# Patient Record
Sex: FEMALE | Race: WHITE | NOT HISPANIC OR LATINO | Employment: UNEMPLOYED | URBAN - METROPOLITAN AREA
[De-identification: names, ages, dates, MRNs, and addresses within clinical notes are randomized per-mention and may not be internally consistent; named-entity substitution may affect disease eponyms.]

---
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
## 2022-09-10 ENCOUNTER — HEALTH MAINTENANCE LETTER (OUTPATIENT)
Age: 22
End: 2022-09-10

## 2023-01-05 NOTE — TELEPHONE ENCOUNTER
Pediatric Panel Management Review      Patient has the following on her problem list:   Immunizations  Immunizations are needed.  Patient is due for:Well Child HPV and Menactra.        Summary:    Patient is due/failing the following:   Immunizations and Physical.    Action needed:   Patient needs office visit for well child check.    Type of outreach:    Sent letter    Questions for provider review:    None.                                                                                                                                    DANIEL Villalta       Chart routed to Care Team .             RICKY

## 2023-10-01 ENCOUNTER — HEALTH MAINTENANCE LETTER (OUTPATIENT)
Age: 23
End: 2023-10-01

## 2024-02-04 ENCOUNTER — TELEPHONE (OUTPATIENT)
Dept: EMERGENCY MEDICINE | Facility: CLINIC | Age: 24
End: 2024-02-04

## 2024-02-04 ENCOUNTER — HOSPITAL ENCOUNTER (EMERGENCY)
Facility: CLINIC | Age: 24
Discharge: HOME | End: 2024-02-04
Attending: EMERGENCY MEDICINE | Admitting: EMERGENCY MEDICINE
Payer: COMMERCIAL

## 2024-02-04 VITALS
SYSTOLIC BLOOD PRESSURE: 127 MMHG | RESPIRATION RATE: 16 BRPM | DIASTOLIC BLOOD PRESSURE: 97 MMHG | TEMPERATURE: 98.5 F | HEART RATE: 112 BPM | OXYGEN SATURATION: 97 %

## 2024-02-04 DIAGNOSIS — F10.920 ALCOHOLIC INTOXICATION WITHOUT COMPLICATION (H): ICD-10-CM

## 2024-02-04 DIAGNOSIS — R45.851 SUICIDE IDEATION: ICD-10-CM

## 2024-02-04 DIAGNOSIS — F41.9 ANXIETY: ICD-10-CM

## 2024-02-04 PROBLEM — F10.90 ALCOHOL USE DISORDER: Status: ACTIVE | Noted: 2024-02-04

## 2024-02-04 PROBLEM — F41.1 GENERALIZED ANXIETY DISORDER: Status: ACTIVE | Noted: 2024-02-04

## 2024-02-04 LAB
AMPHETAMINES UR QL SCN: NORMAL
BARBITURATES UR QL SCN: NORMAL
BENZODIAZ UR QL SCN: NORMAL
BZE UR QL SCN: NORMAL
CANNABINOIDS UR QL SCN: NORMAL
FENTANYL UR QL: NORMAL
HCG UR QL: NEGATIVE
OPIATES UR QL SCN: NORMAL
PCP QUAL URINE (ROCHE): NORMAL

## 2024-02-04 PROCEDURE — 99285 EMERGENCY DEPT VISIT HI MDM: CPT | Performed by: EMERGENCY MEDICINE

## 2024-02-04 PROCEDURE — 80307 DRUG TEST PRSMV CHEM ANLYZR: CPT | Performed by: EMERGENCY MEDICINE

## 2024-02-04 PROCEDURE — 81025 URINE PREGNANCY TEST: CPT | Performed by: EMERGENCY MEDICINE

## 2024-02-04 RX ORDER — GABAPENTIN 100 MG/1
100 CAPSULE ORAL 3 TIMES DAILY
COMMUNITY
Start: 2023-09-29

## 2024-02-04 RX ORDER — GABAPENTIN 300 MG/1
300 CAPSULE ORAL
COMMUNITY
Start: 2023-09-29

## 2024-02-04 RX ORDER — VENLAFAXINE HYDROCHLORIDE 150 MG/1
1 CAPSULE, EXTENDED RELEASE ORAL DAILY
COMMUNITY
Start: 2023-10-02

## 2024-02-04 ASSESSMENT — ACTIVITIES OF DAILY LIVING (ADL)
ADLS_ACUITY_SCORE: 35

## 2024-02-04 NOTE — ED TRIAGE NOTES
Pt presents with mental health crisis. Patient states that her anxiety has been worsening to the point that she feels like she can no longer stand it. Pt endorses thoughts of wanting to be dead due to this anxiety although denies active suicidal ideation. Patient has a history of cutting, most recently this summer. Patient states that she has been drinking too much, 5-8 shots a day and cannot remember last sober period. Denies withdrawal symptoms.      Triage Assessment (Adult)       Row Name 02/04/24 0115          Triage Assessment    Airway WDL WDL        Respiratory WDL    Respiratory WDL WDL        Skin Circulation/Temperature WDL    Skin Circulation/Temperature WDL WDL        Cardiac WDL    Cardiac WDL WDL        Cognitive/Neuro/Behavioral WDL    Cognitive/Neuro/Behavioral WDL WDL

## 2024-02-04 NOTE — CONSULTS
Diagnostic Evaluation Consultation  Crisis Assessment    Patient Name: Meghann Vincent  Age:  23 year old  Legal Sex: female  Gender Identity: female  Pronouns:   Race: White  Ethnicity: Not  or   Language: English      Patient was assessed: In person      Patient location: [facility] EMERGENCY DEPARTMENT                             ED11    Referral Data and Chief Complaint  Meghann Vincent presents to the ED with family/friends. Patient is presenting to the ED for the following concerns: Significant behavioral change, Anxiety, Depression, Suicidal ideation, Intoxication, Substance use, Worsening psychosocial stress.   Factors that make the mental health crisis life threatening or complex are:  Meghann Vincent is a 23 year old female with a past medical history significant for general anxiety disorder who presents to the ED for mental health evaluation.  Patient presented to this ED along with her significant other.  Patient reported that her anxiety has become debilitating to the point that she cannot maintain employment and often is afraid to leave her home.  Patient noted a history of anxiety and family members with significant mental health and substance abuse issues.  Patient cited being prescribed with Effecor 150 mg. but denied any benefits from it.  Patient endorsed thoughts of self self-harm but denied wanting to end her life, but at times disclosed that due to her anxiety that she wouled be better off gone, but wants to live.  No self-injury behavior tonight however has had history of self-injury behavior with cutting in the past.  Patient reported that she took 6 tablets of her gabapentin in July of 2023, but did not intent to die, but was overwhelmed and she just slept it off.    Patient also notes increase in alcohol intake, drinking too much alcohol, and states that this is likely also affecting her anxiety and mental health.  Patient reports drinking approximately 5-8  shots per day.  Patient does not currently have community mental health providers in place..      Informed Consent and Assessment Methods  Explained the crisis assessment process, including applicable information disclosures and limits to confidentiality, assessed understanding of the process, and obtained consent to proceed with the assessment.  Assessment methods included conducting a formal interview with patient, review of medical records, collaboration with medical staff, and obtaining relevant collateral information from family and community providers when available.  : done     Patient response to interventions: eager to participate, acceptance expressed, verbalizes understanding  Coping skills were attempted to reduce the crisis:  Patient has an ability to practice mindfulness skills to alleve her crisis.     History of the Crisis   History of anxiety and depression - never been hospitalized for mental health concerns.    Brief Psychosocial History  Family:  Domestic Partnership, Children no  Support System:  Significant Other, Partner, Parent(s)  Employment Status:  unemployed  Source of Income:  salary/wages  Financial Environmental Concerns:  none  Current Hobbies:  family functions, games, interaction with pets, social media/computer activities, reading, music, television/movies/videos  Barriers in Personal Life:  lack of motivation, emotional concerns, mental health concerns    Significant Clinical History  Current Anxiety Symptoms:  obsessions/compulsions, panic attack, anxious  Current Depression/Trauma:  sense of doom, hopelessness, helplessness, impaired decision making  Current Somatic Symptoms:  excessive worry, sweating, flushing, shaking, anxious  Current Psychosis/Thought Disturbance:  impulsive, high risk behavior, distractability  Current Eating Symptoms:     Chemical Use History:  Alcohol: Daily  Last Use:: 02/03/24  Benzodiazepines: None  Opiates: None  Cocaine: None  Marijuana: None  Other  Use: None  Withdrawal Symptoms: Nausea  Addictions: Other (comment) (alcohol)   Past diagnosis:  Anxiety Disorder, No known past diagnosis  Family history:  Anxiety Disorder, Bipolar Disorder, Depression, Substance Use Disorder  Past treatment:  Individual therapy  Details of most recent treatment:  Recently had a therapist with [facility] - quit after a few sessions and did not find it effective.  Other relevant history:          Collateral Information  Is there collateral information: Yes     Collateral information name, relationship, phone number:  Jack - [phone] - Boyfrienabel said that he was concerned that patient might try to hurt herself since she was making some threats about SI, but boyfriend said that he knows that she would never follow through.  Alcohol has become the contributing factor or issue.    What happened today: Drinking and making SI threats without plan/intent.     What is different about patient's functioning: Patient has increased alcohol intake.     Concern about alcohol/drug use:  yes - excessive use past 6 months.    What do you think the patient needs:  Treatment    Has patient made comments about wanting to kill themselves/others: yes    If d/c is recommended, can they take part in safety/aftercare planning:  yes    Additional collateral information:  Boyfriend said that she is safe returning home.     Risk Assessment  [place] Suicide Severity Rating Scale Full Clinical Version:  Suicidal Ideation  Q1 Wish to be Dead (Lifetime): Yes  Q2 Non-Specific Active Suicidal Thoughts (Lifetime): Yes  3. Active Suicidal Ideation with any Methods (Not Plan) Without Intent to Act (Lifetime): No  Q4 Active Suicidal Ideation with Some Intent to Act, Without Specific Plan (Lifetime): No  Q5 Active Suicidal Ideation with Specific Plan and Intent (Lifetime): No  Q6 Suicide Behavior (Lifetime): yes     Suicidal Behavior (Lifetime)  Actual Attempt (Lifetime): No  Has subject engaged in  non-suicidal self-injurious behavior? (Lifetime): Yes  Interrupted Attempts (Lifetime): No  Aborted or Self-Interrupted Attempt (Lifetime): No  Preparatory Acts or Behavior (Lifetime): No    [place] Suicide Severity Rating Scale Recent:   Suicidal Ideation (Recent)  Q1 Wished to be Dead (Past Month): yes  Q2 Suicidal Thoughts (Past Month): yes  Q3 Suicidal Thought Method: yes  Q4 Suicidal Intent without Specific Plan: yes  Q5 Suicide Intent with Specific Plan: no  Within the Past 3 Months?: no  Level of Risk per Screen: high risk          Environmental or Psychosocial Events: loss of a relationship due to divorce/separation, work or task failure, impulsivity/recklessness, other life stressors, ongoing abuse of substances, neither working nor attending school  Protective Factors: Protective Factors: responsibilities and duties to others, including pets and children, help seeking, intact marriage or domestic partnership, sense of importance of health and wellness, sense of belonging, lives in a responsibly safe and stable environment, strong bond to family unit, community support, or employment    Does the patient have thoughts of harming others? Feels Like Hurting Others: no  Previous Attempt to Hurt Others: no  Is the patient engaging in sexually inappropriate behavior?: no    Is the patient engaging in sexually inappropriate behavior?  no        Mental Status Exam   Affect: Appropriate  Appearance: Inappropriate (wearing shorts in cold weather)  Attention Span/Concentration: Attentive  Eye Contact: Engaged    Fund of Knowledge: Appropriate   Language /Speech Content: Fluent  Language /Speech Volume: Normal  Language /Speech Rate/Productions: Normal  Recent Memory: Intact  Remote Memory: Intact  Mood: Anxious, Sad, Depressed  Orientation to Person: Yes   Orientation to Place: Yes  Orientation to Time of Day: Yes  Orientation to Date: Yes     Situation (Do they understand why they are here?): Yes  Psychomotor  Behavior: Normal  Thought Content: Clear  Thought Form: Obsessive/Perseverative          Medication  Psychotropic medications:   Medication Orders - Psychiatric (From admission, onward)      Start     Dose/Rate Route Frequency Ordered Stop    02/04/24 0225  nicotine (NICORETTE) gum 2 mg        Note to Pharmacy: DO NOT USE THIS FIELD FOR ADMIN INSTRUCTIONS; INFORMATION DOES NOT SHOW ON MAR. USE THE FIELD ABOVE MARKED ADMIN INSTRUCTIONS    2 mg Buccal ONCE 02/04/24 0223               Current Care Team  Patient Care Team:  Becky Sorto MD as PCP - General (Pediatrics)    Diagnosis  Patient Active Problem List   Diagnosis Code    Vitamin D deficiency E55.9    Nevus D22.9    Childhood obesity, BMI  percentile E66.9, Z68.54    Problems related to inappropriate diet and eating habits Z72.4    Generalized anxiety disorder F41.1    Alcohol use disorder F10.90       Primary Problem This Admission  Active Hospital Problems    Generalized anxiety disorder      Alcohol use disorder        Clinical Summary and Substantiation of Recommendations   Meghann Vincent is a 23 year old female with a past medical history significant for general anxiety disorder who presents to the ED for mental health evaluation. Patient presented to this ED along with her significant other. Patient reported that her anxiety has become debilitating to the point that she cannot maintain employment and often is afraid to leave her home. Patient noted a history of anxiety and family members with significant mental health and substance abuse issues. Patient cited being prescribed with Effecor 150 mg. but denied any benefits from it. Patient endorsed thoughts of self self-harm but denied wanting to end her life, but at times disclosed that due to her anxiety that she wouled be better off gone, but wants to live. No self-injury behavior tonight however has had history of self-injury behavior with cutting in the past. Patient reported that she  took 6 tablets of her gabapentin in July of 2023, but did not intent to die, but was overwhelmed and she just slept it off. Patient also notes increase in alcohol intake, drinking too much alcohol, and states that this is likely also affecting her anxiety and mental health. Patient reports drinking approximately 5-8 shots per day. Patient does not currently have community mental health providers in place.  Patient agreed to participate in a dual diagnosis program through [facility] to develop coping skills to deal with anxiety and undergo CD evaluation and cooperate with recommendations for alchol abuse.  Patient will also be referred to [facility] for crisis stabilization and continuity of mental health services until she is able to start with the dual diagnosis program.  This clinician assessed patient and recommend that she be discharged home since she does not pose a risk for harm to self or others and can engage in safety planning.       Patient coping skills attempted to reduce the crisis:  Patient has an ability to practice mindfulness skills to alleve her crisis.    Disposition  Recommended disposition: Individual Therapy, Medication Management, Programmatic Care, Rule 25/MARISOL Assessment, Substance Abuse Disorder Treatment        Reviewed case and recommendations with attending provider. Attending Name: Dr. Manzano       Attending concurs with disposition: yes       Patient and/or validated legal guardian concurs with disposition:   yes       Final disposition:  discharge    Legal status on admission:      Assessment Details   Total duration spent with the patient: 30 min     CPT code(s) utilized: 12084 - Psychotherapy for Crisis - 60 (30-74*) min    Kerrie Rivera [facility], Psychotherapist  [facility] - Triage & Transition Services  Callback: [phone]

## 2024-02-04 NOTE — DISCHARGE INSTRUCTIONS
You have been scheduled with the [facility] for a Diagnostic Assessment appointment on Friday, March 8th at 8am . Please allow up to 90 -120 minutes for your appointment. This is an IN-PERSON appointment.    [facility]  [address] [id number]      *Follow the signs to the Emergency Room and park in the Yellow or Red Ramp.  You can obtain a reduced parking fee of $2 after your appointment.  The office is located next to [facility].  The  office number is [phone].    Please arrive before you scheduled appointment time, late arrivals are subject to the need to reschedule.   Please bring contact names and phone numbers for Emergency Contacts, therapist, psychiatrist, PO, attorneys, or other relevant contacts that may be needed.    *Face masking is optional.    Please note: Parents must accompany any patient under the age of 18. Any patient under legal guardianship will need to bring court documents.    Adult appointments can last up to 90+ minutes.    You will receive a phone call 2-4 days prior to your scheduled time to confirm and remind you of the appointment.      You will receive intake forms via [facility] ([email or web address]) to be completed prior to your appointment.  If able, please complete this prior to your appointment to reduce the appt length of time.      If you need to change this appointment for any reason, please call our Behavioral Access Scheduling office at [phone].  Please note, we ask for at least a 24-hour notice.  Any late cancelations will be considered a no-show.         We are making a referral to the transition clinic for bridging telemedicine appointments (therapy) until your scheduled appointment.  They will be reaching out to you via e-mail or phone in the coming days.  They can be reached at [phone].         A referral to [facility]'s [facility] was also  completed for you. You can reach them at [phone] for updates on this referral or if you need to cancel it if you decide to attend the [facility] program instead. Their address is: [facility], [address] [place] [id number].      It is your responsibility to contact your insurance company directly to verify coverage, eligibility, payment, and benefit information for any appointments or referrals listed above.    [facility] maintains an extensive network of licensed behavioral health providers to connect patients with the services they need. We do not charge providers a fee to participate in our referral network. We match patients with providers based on a patient's specific treatment needs, insurance coverage, and location. Our first effort will be to refer you to a provider within your care system and will utilize providers outside your care system as needed.

## 2024-02-04 NOTE — ED NOTES
Pt. has slept well.  No behavioral concerns.  Pt. will have mental health assessment this a.m. to determine disposition. Continue PPOC.

## 2024-02-04 NOTE — ED PROVIDER NOTES
[facility] EMERGENCY DEPARTMENT ([facility])    2/04/24      ED PROVIDER NOTE      History     Chief Complaint   Patient presents with    Anxiety     Pt endorsing worsening anxiety, pt states she has been on effexor x1 year and feels it is making it worse    Suicidal     Pt states she has thoughts of wanting to be dead due to anxiety. Denies plan. Hx of cutting, last time this summer    Drug / Alcohol Assessment     Pt states she is drinking too much, pt states she is drinking daily 5-8 shots. Cannot remember last sober day.     HPI  Meghann Vincent is a 23 year old female with a past medical history significant for general anxiety disorder who presents to the ED for mental health evaluation.  Patient here with significant other.  Per triage note patient reports worsening anxiety, has been on Effexor for 1 year and feels as if it is making it worse.  Patient also reports suicide ideation and thoughts of wanting to be dead secondary to her anxiety.  Denies any specific plan.  Patient has thoughts of self self-harm.  No self-injury behavior tonight however has had history of self-injury behavior with cutting in the past.  Last episode was in the summer.  Patient also notes increase in alcohol intake, drinking too much alcohol, and states that this is likely also affecting her anxiety and mental health.  Patient reports drinking approximately 5-8 shots per day. No acute medical complaints.  Positive nicotine use.  No other substance use.    Past Medical History  No past medical history on file.  No past surgical history on file.  gabapentin (NEURONTIN) 100 MG capsule  gabapentin (NEURONTIN) 300 MG capsule  venlafaxine (EFFEXOR XR) 150 MG 24 hr capsule  cefdinir (OMNICEF) 300 MG capsule  levonorgestrel-ethinyl estradiol (AVIANE/ALESSE/LESSINA) 0.1-20 MG-MCG tablet      No Known Allergies  Family History  Family History   Problem Relation Age of Onset    Family History Negative Mother      Social History    Social History     Tobacco Use    Smoking status: Passive Smoke Exposure - Never Smoker    Smokeless tobacco: Never   Substance Use Topics    Alcohol use: No    Drug use: No      Past medical history, past surgical history, medications, allergies, family history, and social history were reviewed with the patient. No additional pertinent items.      A medically appropriate review of systems was performed with pertinent positives and negatives noted in the HPI, and all other systems negative.    Physical Exam   BP: (!) 127/97  Pulse: (!) 131  Temp: 98.5  F (36.9  C)  Resp: 16  SpO2: 98 %  Physical Exam  General: Afebrile, no acute distress   HEENT: Normocephalic, atraumatic, conjunctivae normal. MMM  Neck: non-tender, supple  Cardio: regular rate. regular rhythm   Resp: Normal work of breathing, no respiratory distress, lungs clear bilaterally, no wheezing, rhonchi, rales  Chest/Back: no visual signs of trauma, no CVA tenderness   Abdomen: soft, non distension, no tenderness, no peritoneal signs   Neuro: alert and fully oriented. CN II-XII grossly intact. Grossly normal strength and sensation in all extremities.   MSK: no deformities. Normal range of motion  Integumentary/Skin: no rash visualized, normal color  Psych: normal affect, normal behavior      ED Course, Procedures, & Data      Procedures         Mental Health Risk Assessment        PSS-3      Date and Time Over the past 2 weeks have you felt down, depressed, or hopeless? Over the past 2 weeks have you had thoughts of killing yourself? Have you ever attempted to kill yourself? When did this last happen? User   02/04/24 0120 yes yes no -- KMV          C-SSRS ([place])      Date and Time Q1 Wished to be Dead (Past Month) Q2 Suicidal Thoughts (Past Month) Q3 Suicidal Thought Method Q4 Suicidal Intent without Specific Plan Q5 Suicide Intent with Specific Plan Q6 Suicide Behavior (Lifetime) Within the Past 3 Months? RETIRED: Level of Risk per Screen Screening  Not Complete User   02/04/24 0143 yes yes yes yes no yes -- -- -- NG   02/04/24 0120 yes yes yes yes no -- -- -- -- KMV                Suicide assessment completed by mental health (D.E.C., LCSW, etc.)       Results for orders placed or performed during the hospital encounter of 02/04/24   HCG qualitative urine     Status: Normal   Result Value Ref Range    hCG Urine Qualitative Negative Negative   Urine Drug Screen Panel     Status: Normal   Result Value Ref Range    Amphetamines Urine Screen Negative Screen Negative    Barbituates Urine Screen Negative Screen Negative    Benzodiazepine Urine Screen Negative Screen Negative    Cannabinoids Urine Screen Negative Screen Negative    Cocaine Urine Screen Negative Screen Negative    Fentanyl Qual Urine Screen Negative Screen Negative    Opiates Urine Screen Negative Screen Negative    PCP Urine Screen Negative Screen Negative   Urine Drug Screen     Status: Normal    Narrative    The following orders were created for panel order Urine Drug Screen.  Procedure                               Abnormality         Status                     ---------                               -----------         ------                     Urine Drug Screen Panel[[id number]]      Normal              Final result                 Please view results for these tests on the individual orders.     Medications   nicotine (NICORETTE) gum 2 mg (has no administration in time range)     Labs Ordered and Resulted from Time of ED Arrival to Time of ED Departure   HCG QUALITATIVE URINE - Normal       Result Value    hCG Urine Qualitative Negative     URINE DRUG SCREEN PANEL - Normal    Amphetamines Urine Screen Negative      Barbituates Urine Screen Negative      Benzodiazepine Urine Screen Negative      Cannabinoids Urine Screen Negative      Cocaine Urine Screen Negative      Fentanyl Qual Urine Screen Negative      Opiates Urine Screen Negative      PCP Urine Screen Negative       No orders to display           Critical care was not performed.     Medical Decision Making  The patient's presentation was of high complexity (a chronic illness severe exacerbation, progression, or side effect of treatment).    The patient's evaluation involved:  an assessment requiring an independent historian (significant other)  review of external note(s) from 3+ sources (prior Family practice visit notes)  discussion of management or test interpretation with another health professional (behavioral health , morning provider)    The patient's management necessitated high risk (a decision regarding hospitalization) and further care after sign-out to morning provider (see their note for further management).    Assessment & Plan    Meghann Vincent is a 23 year old female with a past medical history significant for general anxiety disorder who presents to the ED for mental health evaluation.  Patient reports worsening anxiety, suicide ideation/thoughts, and thoughts of self-harm along with increasing alcohol use.  No specific plan, no acute medical complaints.  Patient presents tonight with her significant other (Jack Sutton - [phone]).     Patient signed out to morning provider pending behavioral health assessment, reevaluation, final disposition.          I have reviewed the nursing notes. I have reviewed the findings, diagnosis, plan and need for follow up with the patient.    New Prescriptions    No medications on file       Final diagnoses:   Anxiety   Suicide ideation   Alcoholic intoxication without complication (H24)       Millie Beach   [facility] EMERGENCY DEPARTMENT  2/4/2024     Millie Beach MD  02/04/24 0408

## 2024-02-04 NOTE — PROGRESS NOTES
Patient signed out from Dr. Beach pending evaluation by [facility], appreciate their recommendations    They evaluated the patient and feel that she is appropriate for outpatient therapy which was Dr. Beach's impression as well.  They have provided her with intensive resources as well as a plan for in person visits.  I discussed this plan with the patient and her , she is comfortable with this plan and would like to leave to pursue it.  Will proceed with discharge.

## 2024-02-04 NOTE — ED NOTES
Pt requested nicotine gum, order obtained, upon attempt to give the gum pt was found to be sleep. Dose re-timed to later in the AM.

## 2024-02-04 NOTE — TELEPHONE ENCOUNTER
"Pt is a(n) adult (18+ out of HS) Seeking as eval for Adult Dual Assessment for evaluation of both MH & MARISOL to determine primary treatment options..  Appointment scheduled by:  Patient.  (self-pay - complete Cost Estimate)  Caller name:  Erwin Peterson     Caller phone #: [phone]  Legal Guardianship Reviewed?  Yes: self  Honoring Choices Notified?  No  Brief reason for appt:  Per LMHP, patient would benefit from MH and MARISOL programmatic care. See [facility] Assessment for more info.      needed?  NO    Contact information verified/updated: Yes    Erwin Gibbs    \"We have scheduled your evaluation. In the event that your insurance coverage comes back as out of network, you may receive a call to cancel your appointment and direct you to your insurance company for in-network coverage.\"    Disclaimer regarding insurance read to patient?  No   "

## 2024-02-05 ENCOUNTER — TELEPHONE (OUTPATIENT)
Dept: BEHAVIORAL HEALTH | Facility: CLINIC | Age: 24
End: 2024-02-05
Payer: COMMERCIAL

## 2024-02-05 NOTE — TELEPHONE ENCOUNTER
First attempt to contact pt. Terryr left a VM with TC contact info and encouraged a phone call back to schedule initial therapy appointment. Terryr will postpone for tomorrow.    Serena Lundy  02/05/2024  1014

## 2024-02-05 NOTE — TELEPHONE ENCOUNTER
Left message for patient asking for a return call to schedule with the TC.     Velia Guerrero  Transition Clinic Coordinator  02/05/24 11:44 AM

## 2024-02-05 NOTE — TELEPHONE ENCOUNTER
----- Message from Erwin Gibbs sent at 2/4/2024  9:19 AM CST -----  Regarding: Referral for Therapy  Transition Clinic Referral   [place]       Please Check Type of Referral Requested:       __x__THERAPY: The Transition clinic is able to schedule patients without current medical insurance; these patient will be referred to our Social Work Care Coordinator for Medical Insurance              Assistance. We are open for referral for psychotherapy. Patient is referred from:  [facility]      ____MEDICATION:   Referrals for Medication are ONLY accepted from the following areas (select): Emergency Department/Urgent Care - HIGH PRIORITY                                        Suboxone and Opioid Management Referrals are automatically denied.   TC Psychiatry cannot see patient without active medical insurance.   Next level of psychiatry care must be within 12 months.  TC Psychiatry cannot accept patient with next level of care scheduled with PCP.  The transition clinic cannot follow patients who are on a restricted recipient program.      Referring Provider Contact Name: Kerrie Whatleymallyefrendaríovince; Phone Number: [phone]    Reason for Transition Clinic Referral: Patient would benefit from therapy until she starts programmatic care. Referral for programmatic care was made today.    Next Level of Care Patient Will Be Transitioned To: Home  Provider(s)x  Location x  Date/Time x    What Would Be Helpful from the Transition Clinic: Therapy     Needs: NO    Does Patient Have Access to Technology: Yes    Patient E-mail Address: [email or web address]    Current Patient Phone Number: [phone]    Clinician Gender Preference (if applicable): NO    Patient location preference: [place]      (Master Form: Updated 11/28/2023)

## 2024-02-05 NOTE — TELEPHONE ENCOUNTER
Pt mother [phone] called back to TC said got a message about daughter. Said daughter is own guardian and can be reached at [phone]. Coordinator will update chart to reflect pt number should be primary number to call.     Routing to pool to call patient again at her direct number.    Velia Guerrero  Transition Clinic Coordinator  02/05/24 11:41 AM

## 2024-02-06 ENCOUNTER — TELEPHONE (OUTPATIENT)
Dept: BEHAVIORAL HEALTH | Facility: CLINIC | Age: 24
End: 2024-02-06
Payer: COMMERCIAL

## 2024-02-06 ASSESSMENT — ANXIETY QUESTIONNAIRES
2. NOT BEING ABLE TO STOP OR CONTROL WORRYING: NEARLY EVERY DAY
4. TROUBLE RELAXING: MORE THAN HALF THE DAYS
6. BECOMING EASILY ANNOYED OR IRRITABLE: NEARLY EVERY DAY
7. FEELING AFRAID AS IF SOMETHING AWFUL MIGHT HAPPEN: NEARLY EVERY DAY
8. IF YOU CHECKED OFF ANY PROBLEMS, HOW DIFFICULT HAVE THESE MADE IT FOR YOU TO DO YOUR WORK, TAKE CARE OF THINGS AT HOME, OR GET ALONG WITH OTHER PEOPLE?: EXTREMELY DIFFICULT
7. FEELING AFRAID AS IF SOMETHING AWFUL MIGHT HAPPEN: NEARLY EVERY DAY
IF YOU CHECKED OFF ANY PROBLEMS ON THIS QUESTIONNAIRE, HOW DIFFICULT HAVE THESE PROBLEMS MADE IT FOR YOU TO DO YOUR WORK, TAKE CARE OF THINGS AT HOME, OR GET ALONG WITH OTHER PEOPLE: EXTREMELY DIFFICULT
GAD7 TOTAL SCORE: 18
3. WORRYING TOO MUCH ABOUT DIFFERENT THINGS: NEARLY EVERY DAY
1. FEELING NERVOUS, ANXIOUS, OR ON EDGE: NEARLY EVERY DAY
GAD7 TOTAL SCORE: 18
5. BEING SO RESTLESS THAT IT IS HARD TO SIT STILL: SEVERAL DAYS
GAD7 TOTAL SCORE: 18

## 2024-02-06 ASSESSMENT — PATIENT HEALTH QUESTIONNAIRE - PHQ9
10. IF YOU CHECKED OFF ANY PROBLEMS, HOW DIFFICULT HAVE THESE PROBLEMS MADE IT FOR YOU TO DO YOUR WORK, TAKE CARE OF THINGS AT HOME, OR GET ALONG WITH OTHER PEOPLE: EXTREMELY DIFFICULT
SUM OF ALL RESPONSES TO PHQ QUESTIONS 1-9: 18
SUM OF ALL RESPONSES TO PHQ QUESTIONS 1-9: 18

## 2024-02-06 NOTE — TELEPHONE ENCOUNTER
Second attempt at reaching patient. Left message asking for a return call to schedule with the TC. Referral will be closed, reply sent to referral source and tracker completed.    Velia Guerrero  Transition Clinic Coordinator  02/06/24 8:24 AM

## 2024-02-06 NOTE — TELEPHONE ENCOUNTER
----- Message from Erwin Gibbs sent at 2/4/2024  9:19 AM CST -----  Regarding: Referral for Therapy  Transition Clinic Referral   [place]       Please Check Type of Referral Requested:       __x__THERAPY: The Transition clinic is able to schedule patients without current medical insurance; these patient will be referred to our Social Work Care Coordinator for Medical Insurance              Assistance. We are open for referral for psychotherapy. Patient is referred from:  [facility]      ____MEDICATION:   Referrals for Medication are ONLY accepted from the following areas (select): Emergency Department/Urgent Care - HIGH PRIORITY                                        Suboxone and Opioid Management Referrals are automatically denied.   TC Psychiatry cannot see patient without active medical insurance.   Next level of psychiatry care must be within 12 months.  TC Psychiatry cannot accept patient with next level of care scheduled with PCP.  The transition clinic cannot follow patients who are on a restricted recipient program.      Referring Provider Contact Name: Kerrie Whatleymallyefrendaríovince; Phone Number: [phone]    Reason for Transition Clinic Referral: Patient would benefit from therapy until she starts programmatic care. Referral for programmatic care was made today.    Next Level of Care Patient Will Be Transitioned To: Home  Provider(s)x  Location x  Date/Time x    What Would Be Helpful from the Transition Clinic: Therapy     Needs: NO    Does Patient Have Access to Technology: Yes    Patient E-mail Address: [email or web address]    Current Patient Phone Number: [phone]    Clinician Gender Preference (if applicable): NO    Patient location preference: [place]      (Master Form: Updated 11/28/2023)

## 2024-02-06 NOTE — TELEPHONE ENCOUNTER
Patient returned call to TC. Coordinator explained TC services. Scheduled initial TC Therapy 2/7/2024.    Explained will be assigned questionnaires in [employer], encouraged to complete prior to the scheduled appointment.     Velia Guerrero  Transition Clinic Coordinator  02/06/24 11:27 AM

## 2024-02-07 ENCOUNTER — VIRTUAL VISIT (OUTPATIENT)
Dept: BEHAVIORAL HEALTH | Facility: CLINIC | Age: 24
End: 2024-02-07
Payer: COMMERCIAL

## 2024-02-07 DIAGNOSIS — F41.1 GENERALIZED ANXIETY DISORDER: Primary | ICD-10-CM

## 2024-02-07 ASSESSMENT — COLUMBIA-SUICIDE SEVERITY RATING SCALE - C-SSRS
ATTEMPT SINCE LAST CONTACT: NO
SUICIDE, SINCE LAST CONTACT: NO
2. HAVE YOU ACTUALLY HAD ANY THOUGHTS OF KILLING YOURSELF?: NO
6. HAVE YOU EVER DONE ANYTHING, STARTED TO DO ANYTHING, OR PREPARED TO DO ANYTHING TO END YOUR LIFE?: NO
TOTAL  NUMBER OF ABORTED OR SELF INTERRUPTED ATTEMPTS SINCE LAST CONTACT: NO
1. SINCE LAST CONTACT, HAVE YOU WISHED YOU WERE DEAD OR WISHED YOU COULD GO TO SLEEP AND NOT WAKE UP?: NO
TOTAL  NUMBER OF INTERRUPTED ATTEMPTS SINCE LAST CONTACT: NO

## 2024-02-07 NOTE — PROGRESS NOTES
"    Transition Clinic - Initial Visit Progress Note    Patient  Name: Meghann Vincent, : 2000    Date:  2024       Service Type:  Mental Health Initial Visit       VISIT TIME START: 1225pm  VISIT TIME END: 1255pm     Session Length:   TC Session Length: 30 (16-37 Minutes)    Visit #: 1    Attendees:  TC Attendees: Client alone    Service Modality:  Service Modality: Video Visit:      Provider verified identity through the following two step process.  Patient provided:  Patient  and Patient address    Telemedicine Visit: The patient's condition can be safely assessed and treated via synchronous audio and visual telemedicine encounter.      Reason for Telemedicine Visit: Patient convenience (e.g. access to timely appointments / distance to available provider)    Originating Site (Patient Location): Patient's home    Distant Site (Provider Location): [facility]    Consent:  The patient/guardian has verbally consented to: the potential risks and benefits of telemedicine (video visit) versus in person care; bill my insurance or make self-payment for services provided; and responsibility for payment of non-covered services.     Patient would like the video invitation sent by:  My Chart    Mode of Communication:  [facility]    Distant Location (Provider):  Off-site    As the provider I attest to compliance with applicable laws and regulations related to telemedicine.    Source of Referral:  Other \"Kerrie Rivera\" per chart review of referral note.    Informed Consent and Assessment Methods    This provider and patient discussed HIPAA, and the limits of confidentiality; including mandated reporting, the possibility of collaborative discussions with patient's primary care provider and the multidisciplinary team in the MH clinic during consultation.  Discussed the no show policy, and the benefits and possible unintended consequences of " "therapy.  Patient indicated understanding Transition Clinic services are short term, solution focused, until a referral can be made and patient can bridge to long term therapy.  Patient verbally indicated understanding the informed consent.         Presenting Concerns/  Current Stressors:  Meghann Vincent is a 23 year old who was referred to the Transition Clinic by \"\"Kerrie Rivera\" for \"gap therapy\" with next LOC not listed \"programmatic care 3/8/24\" per pt, per chart review of referral note.    Introduced Transition Clinic services as short-term brief psychotherapy until connected/transitioned to next LOC.      Meghann Vincent reports she is awaiting programmatic care that starts on 3/8/24.  Pt reports she was recently in ED 2/4/24 for alcohol intox/withdrawal\", and left with recs for programmatic care\".  Pt today, reports anxiety and depression sx including excessive worry difficulty controlling the worry, low mood.  Pt reports the anxiety is worse than the depression, screeners indicate congruence. Pt does report sx have improved since 2/4/24. Pt reports adequate sleep.  Pt reports she has been \"sober since 2/4/24\".  Pt reports things have been going well, spending time with \"animals and distracting self with some video games\".  Pt reports motivation for sobriety.  Pt denies current SI, plan, intent, HI, AH/VH at this point in time.  Pt denies hx of attempts and/or psych hosp hx, appears congruent with information available in chart review of epic ehr at the time of this note. CSSR since last contact completed.  Pt reports she is hopeful, looking forward to \"sobriety\".  She states \"I know deep down I want to stop and be sober, I have intrinsic motivation\".  Pt reports the following protective factors: supportive fam/friends, willingness to participate in therapy, hope for the future, future-oriented, attached to life by fam/friends/pets, responsibility to fam/friends/pets, embedded in a protective " "network\".  Pt reports she is well supported including family, partner, friends.      Pt reports historical coping skills of social support from mom/partner, hot showers, distraction\".  Pt and writer explored deep breathing, and 5 senses grounding technique with focus on 5 things can see, hear, touch.  Pt appeared engaged and receptive to the above interventions.      Plan:  2/14/24 @ 12:30pm     ASSESSMENT:    Required Screenings: The following assessments were completed by patient for this visit:  PHQ9:       2/24/2017     4:35 PM 2/6/2024     3:52 PM   PHQ-9 SCORE   PHQ-9 Total Score MyChart  18 (Moderately severe depression)   PHQ-9 Total Score 17 18     GAD7:       2/24/2017     4:35 PM 2/6/2024     4:00 PM   SPENCER-7 SCORE   Total Score  18 (severe anxiety)   Total Score 11 18     CAGE-AID:       2/6/2024     4:03 PM   CAGE-AID Total Score   Total Score 4   Total Score MyChart 4 (A total score of 2 or greater is considered clinically significant)     PROMIS 10-Global Health (all questions and answers displayed):       2/6/2024     4:02 PM   PROMIS 10   In general, would you say your health is: Good   In general, would you say your quality of life is: Fair   In general, how would you rate your physical health? Fair   In general, how would you rate your mental health, including your mood and your ability to think? Poor   In general, how would you rate your satisfaction with your social activities and relationships? Fair   In general, please rate how well you carry out your usual social activities and roles Fair   To what extent are you able to carry out your everyday physical activities such as walking, climbing stairs, carrying groceries, or moving a chair? Completely   In the past 7 days, how often have you been bothered by emotional problems such as feeling anxious, depressed, or irritable? Always   In the past 7 days, how would you rate your fatigue on average? Severe   In the past 7 days, how would you rate " your pain on average, where 0 means no pain, and 10 means worst imaginable pain? 4   In general, would you say your health is: 3   In general, would you say your quality of life is: 2   In general, how would you rate your physical health? 2   In general, how would you rate your mental health, including your mood and your ability to think? 1   In general, how would you rate your satisfaction with your social activities and relationships? 2   In general, please rate how well you carry out your usual social activities and roles. (This includes activities at home, at work and in your community, and responsibilities as a parent, child, spouse, employee, friend, etc.) 2   To what extent are you able to carry out your everyday physical activities such as walking, climbing stairs, carrying groceries, or moving a chair? 5   In the past 7 days, how often have you been bothered by emotional problems such as feeling anxious, depressed, or irritable? 5   In the past 7 days, how would you rate your fatigue on average? 4   In the past 7 days, how would you rate your pain on average, where 0 means no pain, and 10 means worst imaginable pain? 4   Global Mental Health Score 6   Global Physical Health Score 12   PROMIS TOTAL - SUBSCORES 18     [place] Suicide Severity Rating Scale (Lifetime/Recent)      2/4/2024     1:20 AM 2/4/2024     1:43 AM 2/4/2024     9:19 AM   [place] Suicide Severity Rating (Lifetime/Recent)   Q1 Wish to be Dead (Lifetime)   Yes   Q2 Non-Specific Active Suicidal Thoughts (Lifetime)   Yes   Q1 Wished to be Dead (Past Month) yes yes    Q2 Suicidal Thoughts (Past Month) yes yes    Q3 Suicidal Thought Method yes yes    Q4 Suicidal Intent without Specific Plan yes yes    Q5 Suicide Intent with Specific Plan no no    Q6 Suicide Behavior (Lifetime)  yes yes   Within the Past 3 Months?  no    Level of Risk per Screen high risk high risk    3. Active Suicidal Ideation with any Methods (Not Plan) Without Intent to Act  (Lifetime)   N   Q4 Active Suicidal Ideation with Some Intent to Act, Without Specific Plan (Lifetime)   N   Q5 Active Suicidal Ideation with Specific Plan and Intent (Lifetime)   N   Actual Attempt (Lifetime)   N   Has subject engaged in non-suicidal self-injurious behavior? (Lifetime)   Y   Interrupted Attempts (Lifetime)   N   Aborted or Self-Interrupted Attempt (Lifetime)   N   Preparatory Acts or Behavior (Lifetime)   N   Calculated C-SSRS Risk Score (Lifetime/Recent)   No Risk Indicated     [place] Suicide Severity Rating Scale (Short Version)      2/4/2024     1:20 AM 2/4/2024     1:43 AM 2/4/2024     9:19 AM 2/7/2024    12:00 PM   [place] Suicide Severity Rating (Short Version)   Over the past 2 weeks have you felt down, depressed, or hopeless? yes      Over the past 2 weeks have you had thoughts of killing yourself? yes      Have you ever attempted to kill yourself? no      Q1 Wished to be Dead (Past Month) yes yes     Q2 Suicidal Thoughts (Past Month) yes yes     Q3 Suicidal Thought Method yes yes     Q4 Suicidal Intent without Specific Plan yes yes     Q5 Suicide Intent with Specific Plan no no     Q6 Suicide Behavior (Lifetime)  yes yes    Within the Past 3 Months?  no     Level of Risk per Screen high risk high risk     High Risk Required Interventions  On continuous in person observation     Required Interventions Provider notified Room searched;Patient searched     Interventions DEC consulted DEC consulted     1. Wish to be Dead (Since Last Contact)    N   2. Non-Specific Active Suicidal Thoughts (Since Last Contact)    N   Actual Attempt (Since Last Contact)    N   Has subject engaged in non-suicidal self-injurious behavior? (Since Last Contact)    N   Interrupted Attempts (Since Last Contact)    N   Aborted or Self-Interrupted Attempt (Since Last Contact)    N   Preparatory Acts or Behavior (Since Last Contact)    N   Suicide (Since Last Contact)    N   Calculated C-SSRS Risk Score (Since Last  Contact)    No Risk Indicated       Mental Status Assessment:  Appearance:   Appropriate   Eye Contact:   Good   Psychomotor Behavior: Normal   Attitude:   Cooperative   Orientation:   All  Speech     Rate / Production:  Normal/ Responsive     Volume:   Normal   Mood:    Normal  Affect:    Appropriate   Thought Content:  Clear   Thought Form:  Coherent  Logical   Insight:    Good       Safety Issues and Plan for Safety and Risk Management:     Patient denies current fears or concerns for personal safety.  Patient denies current or recent suicidal ideation or behaviors.  Patient denies current or recent homicidal ideation or behaviors.  Patient denies current or recent self injurious behavior or ideation.  Patient denies other safety concerns.  Recommended that patient call 911 or go to the local ED should there be a change in any of these risk factors. Reviewed pts lisa brown safety plan from recent ED visit, safety plan made available to pt, pt agrees to follow, safety plan at the bottom of this page, and is in pts active mychart.       Diagnostic Criteria:  Generalized Anxiety Disorder  A. Excessive anxiety and worry about a number of events or activities (such as work or school performance).   B. The person finds it difficult to control the worry.  C. Select 3 or more symptoms (required for diagnosis). Only one item is required in children.   - Restlessness or feeling keyed up or on edge.    - Being easily fatigued.    - Difficulty concentrating or mind going blank.    - Irritability.    - Sleep disturbance (difficulty falling or staying asleep, or restless unsatisfying sleep).     DSM5 Diagnoses: (Sustained by DSM5 Criteria Listed Above)  Diagnoses: 300.02 (F41.1) Generalized Anxiety Disorder  Psychosocial & Contextual Factors: Pt reports she was recently in ED for alcohol withdrawal.  Pt reports she will be starting MI/CD programmatic care 3/8/24 looking for supportive therapy until  then.      Intervention:      Brief Psychotherapy - discussed and prioritizing the most efficient treatment., Cognitive Behavioral Therapy (CBT) - Discussed changing thoughts is the path to changing behaviors and feelings., Motivational Interviewing - helping to find the motivation to make positive behavior changes., and Person-Centered Therapy - Actualizes potential and moves towards increased awareness, spontaneity, trust in self and inner directedness.    Collateral Reports Completed:  TC Collateral: [facility] electronic medical records reviewed.    DATA:  Interactive Complexity: No  Crisis: No    PLAN: (Homework, other):  Provider will continue Diagnostic Assessment. Initial Treatment will focus on: Anxiety - depression, sobriety .    2.   Information in this assessment was obtained from the medical record and provided by patient who is a good historian.   4.   Follow up scheduled:  2/14/24 @ 12:30p        Patient was given the following to do until next session:  encouraged self-care and 5 senses grounding technique  5.  Anticipated Discharge: Anticipated Discharge Time: 1 - 3 Months     Procedure Code:  Psychotherapy (with patient) - 30  [CPT 20766]    NATANAELAJ KEYES    Suicide Risk and Safety Concerns were assessed for. Patient meets the following risk assessment and triage: Patient has no change in safety concerns. Committed to safety and agreed to follow previously developed safety plan. .  Reviewed pts lisa brown safety plan from recent ED visit, safety plan made available to pt, pt agrees to follow, safety plan at the bottom of this page, and is in pts active mychart.  _______________________________________________________________________________________________________________________  Sharyn Safety Plan  Creation Date: 2/4/24  Step 1: Warning signs:  Warning Signs  Increased anxiety and increased isolation and fears about leaving home.  Step 2: Internal coping strategies - Things I can  do to take my mind off my problems without  contacting another person:  Strategies  Listen to music - practice mindfulness techniques - Listen to apple applications on mindfulness.  Step 3: People and social settings that provide distraction:  Name Contact Information  Jack [phone]  Step 4: People whom I can ask for help during a crisis:  Name Contact Information  [facility] Adult crisis [phone]  Step 5: Professionals or agencies I can contact during a crisis:  Clinician/Agency Name Phone Emergency Contact  [facility] Adult crisis [phone]  [facility] Emergency Department  Emergency Department  Address Emergency Department Phone  [facility] ED [address] [phone]  Suicide Prevention Lifeline Phone: Call or Text 253  Crisis Text Line: Text HOME to 371157  Step 6: Making the environment safer (plan for lethal means safety):  Declined to answer  Optional: What is most important to me and worth living for?:  Patient loves her family and her younger siblings. She shares two cats and a dog with boyfriend.  Sharyn Safety Plan. Lety Sands and Greyson Altamirano. Used with permission of the  authors.

## 2024-02-13 ENCOUNTER — TELEPHONE (OUTPATIENT)
Dept: BEHAVIORAL HEALTH | Facility: CLINIC | Age: 24
End: 2024-02-13
Payer: COMMERCIAL

## 2024-02-13 NOTE — TELEPHONE ENCOUNTER
Patient called into queue needing to reschedule appointment. Patient's 2/14 appointment is rescheduled for 2/21.    Karol Hernandez  Transition Clinic Coordinator  02/13/24 4:03 PM

## 2024-02-21 ENCOUNTER — TELEPHONE (OUTPATIENT)
Dept: BEHAVIORAL HEALTH | Facility: CLINIC | Age: 24
End: 2024-02-21
Payer: COMMERCIAL

## 2024-02-21 NOTE — TELEPHONE ENCOUNTER
Writer spoke with patient and rescheduled patient for 02/26/2024 @ 2:30 pm for tc therapy.    Serena Lundy  02/21/2024  83

## 2024-11-24 ENCOUNTER — HEALTH MAINTENANCE LETTER (OUTPATIENT)
Age: 24
End: 2024-11-24